# Patient Record
Sex: MALE | Race: WHITE | URBAN - METROPOLITAN AREA
[De-identification: names, ages, dates, MRNs, and addresses within clinical notes are randomized per-mention and may not be internally consistent; named-entity substitution may affect disease eponyms.]

---

## 2019-11-25 ENCOUNTER — OFFICE VISIT (OUTPATIENT)
Dept: URBAN - METROPOLITAN AREA CLINIC 48 | Facility: CLINIC | Age: 64
End: 2019-11-25
Payer: COMMERCIAL

## 2019-11-25 PROCEDURE — 92020 GONIOSCOPY: CPT | Performed by: OPHTHALMOLOGY

## 2019-11-25 PROCEDURE — 92012 INTRM OPH EXAM EST PATIENT: CPT | Performed by: OPHTHALMOLOGY

## 2019-11-25 PROCEDURE — 66250 FOLLOW-UP SURGERY OF EYE: CPT | Performed by: OPHTHALMOLOGY

## 2019-11-25 RX ORDER — BRIMONIDINE TARTRATE, TIMOLOL MALEATE 2; 5 MG/ML; MG/ML
SOLUTION/ DROPS OPHTHALMIC
Qty: 5 | Refills: 2 | Status: INACTIVE
Start: 2019-11-25 | End: 2019-11-25

## 2019-11-25 RX ORDER — PREDNISOLONE ACETATE 10 MG/ML
1 % SUSPENSION/ DROPS OPHTHALMIC
Qty: 5 | Refills: 2 | Status: INACTIVE
Start: 2019-11-25 | End: 2020-03-16

## 2019-11-25 RX ORDER — OFLOXACIN 3 MG/ML
0.3 % SOLUTION/ DROPS OPHTHALMIC
Qty: 5 | Refills: 0 | Status: INACTIVE
Start: 2019-11-25 | End: 2019-11-25

## 2019-11-25 RX ORDER — BIMATOPROST 0.1 MG/ML
0.01 % SOLUTION/ DROPS OPHTHALMIC
Qty: 2.5 | Refills: 2 | Status: INACTIVE
Start: 2019-11-25 | End: 2019-11-25

## 2019-11-25 ASSESSMENT — KERATOMETRY
OD: 44.13
OS: 43.75

## 2019-11-25 ASSESSMENT — INTRAOCULAR PRESSURE
OD: 21
OS: 35

## 2019-11-25 NOTE — IMPRESSION/PLAN
Impression: Capslr glaucoma w/pseudxf lens, left eye, severe stage: Y53.9258. Plan: IOP elevated on OS Bleb needling with 5 Fu Injection given today. All potential side effects and benefits of medication discussed. Reassured patient of current condition and treatment. Bleb needling with 5 Fu Injection risks and benefits were discussed, explained and understood by patient. Patient advised to use Preservative Free eye drops due to injection causing dryness. Start Oflaxacin TID OS for a week IOP is extremely high to OS recommend  bleb revision with diluted  MC and start  tropical ocular medication Lumigan QHS OS, Combigan BID OS, to further lowering of intraocular pressure . Risks and benefits of the surgery explained. Risk of hypotony in view of avascular bleb. Patient understands that the purpose of the surgery is to lower eye pressure and not to improve vision. Patient is to start Prednisolone QID and Ofloxacin QID times a day one day prior to surgery. All potential side effects and benefits of medication discussed. Patient to bring in all drops to PO visits.  

Schedule Bleb revision augmented with mitomycin C;  RL2 OS

## 2019-12-04 ENCOUNTER — SURGERY (OUTPATIENT)
Dept: URBAN - METROPOLITAN AREA SURGERY 26 | Facility: SURGERY | Age: 64
End: 2019-12-04
Payer: COMMERCIAL

## 2019-12-04 PROCEDURE — 66250 FOLLOW-UP SURGERY OF EYE: CPT | Performed by: OPHTHALMOLOGY

## 2019-12-05 ENCOUNTER — POST-OPERATIVE VISIT (OUTPATIENT)
Dept: URBAN - METROPOLITAN AREA CLINIC 48 | Facility: CLINIC | Age: 64
End: 2019-12-05

## 2019-12-05 PROCEDURE — 99024 POSTOP FOLLOW-UP VISIT: CPT | Performed by: OPHTHALMOLOGY

## 2019-12-05 ASSESSMENT — INTRAOCULAR PRESSURE: OS: 3

## 2019-12-12 ENCOUNTER — POST-OPERATIVE VISIT (OUTPATIENT)
Dept: URBAN - METROPOLITAN AREA CLINIC 48 | Facility: CLINIC | Age: 64
End: 2019-12-12

## 2019-12-12 PROCEDURE — 99024 POSTOP FOLLOW-UP VISIT: CPT | Performed by: OPHTHALMOLOGY

## 2019-12-12 ASSESSMENT — INTRAOCULAR PRESSURE: OS: 7

## 2020-01-06 ENCOUNTER — POST-OPERATIVE VISIT (OUTPATIENT)
Dept: URBAN - METROPOLITAN AREA CLINIC 48 | Facility: CLINIC | Age: 65
End: 2020-01-06
Payer: COMMERCIAL

## 2020-01-06 DIAGNOSIS — Z09 ENCNTR FOR F/U EXAM AFT TRTMT FOR COND OTH THAN MALIG NEOPLM: Primary | ICD-10-CM

## 2020-01-06 PROCEDURE — 99024 POSTOP FOLLOW-UP VISIT: CPT | Performed by: OPHTHALMOLOGY

## 2020-01-06 ASSESSMENT — INTRAOCULAR PRESSURE
OD: 23
OS: 33

## 2020-01-13 ENCOUNTER — POST-OPERATIVE VISIT (OUTPATIENT)
Dept: URBAN - METROPOLITAN AREA CLINIC 48 | Facility: CLINIC | Age: 65
End: 2020-01-13

## 2020-01-13 PROCEDURE — 99024 POSTOP FOLLOW-UP VISIT: CPT | Performed by: OPHTHALMOLOGY

## 2020-01-13 RX ORDER — BIMATOPROST 0.1 MG/ML
0.01 % SOLUTION/ DROPS OPHTHALMIC
Qty: 2.5 | Refills: 2 | Status: INACTIVE
Start: 2020-01-13 | End: 2020-03-16

## 2020-01-13 ASSESSMENT — INTRAOCULAR PRESSURE
OD: 22
OS: 27

## 2020-01-20 ENCOUNTER — POST-OPERATIVE VISIT (OUTPATIENT)
Dept: URBAN - METROPOLITAN AREA CLINIC 48 | Facility: CLINIC | Age: 65
End: 2020-01-20
Payer: COMMERCIAL

## 2020-01-20 PROCEDURE — 66250 FOLLOW-UP SURGERY OF EYE: CPT | Performed by: OPHTHALMOLOGY

## 2020-01-20 PROCEDURE — 99024 POSTOP FOLLOW-UP VISIT: CPT | Performed by: OPHTHALMOLOGY

## 2020-01-20 ASSESSMENT — INTRAOCULAR PRESSURE: OS: 28

## 2020-02-03 ENCOUNTER — POST-OPERATIVE VISIT (OUTPATIENT)
Dept: URBAN - METROPOLITAN AREA CLINIC 48 | Facility: CLINIC | Age: 65
End: 2020-02-03

## 2020-02-03 PROCEDURE — 99024 POSTOP FOLLOW-UP VISIT: CPT | Performed by: OPHTHALMOLOGY

## 2020-02-03 ASSESSMENT — INTRAOCULAR PRESSURE
OD: 23
OS: 13

## 2020-02-24 ENCOUNTER — POST-OPERATIVE VISIT (OUTPATIENT)
Dept: URBAN - METROPOLITAN AREA CLINIC 48 | Facility: CLINIC | Age: 65
End: 2020-02-24

## 2020-02-24 PROCEDURE — 99024 POSTOP FOLLOW-UP VISIT: CPT | Performed by: OPHTHALMOLOGY

## 2020-02-24 ASSESSMENT — INTRAOCULAR PRESSURE
OS: 17
OD: 20

## 2020-03-16 ENCOUNTER — OFFICE VISIT (OUTPATIENT)
Dept: URBAN - METROPOLITAN AREA CLINIC 48 | Facility: CLINIC | Age: 65
End: 2020-03-16
Payer: COMMERCIAL

## 2020-03-16 DIAGNOSIS — H40.1423 CAPSLR GLAUCOMA W/PSEUDXF LENS, LEFT EYE, SEVERE STAGE: Primary | ICD-10-CM

## 2020-03-16 PROCEDURE — 92012 INTRM OPH EXAM EST PATIENT: CPT | Performed by: OPHTHALMOLOGY

## 2020-03-16 ASSESSMENT — INTRAOCULAR PRESSURE
OS: 23
OD: 20

## 2020-03-16 NOTE — IMPRESSION/PLAN
Impression: Capslr glaucoma w/pseudxf lens, left eye, severe stage: R53.0113. Plan: IOP high, thick bleb. Restart lumigan qhs OS. IOP check in 6 weeks with testing.

## 2021-11-11 ENCOUNTER — OFFICE VISIT (OUTPATIENT)
Dept: URBAN - METROPOLITAN AREA CLINIC 48 | Facility: CLINIC | Age: 66
End: 2021-11-11
Payer: MEDICARE

## 2021-11-11 PROCEDURE — 92014 COMPRE OPH EXAM EST PT 1/>: CPT | Performed by: OPHTHALMOLOGY

## 2021-11-11 PROCEDURE — 92133 CPTRZD OPH DX IMG PST SGM ON: CPT | Performed by: OPHTHALMOLOGY

## 2021-11-11 RX ORDER — PREDNISOLONE ACETATE 10 MG/ML
1 % SUSPENSION/ DROPS OPHTHALMIC
Qty: 5 | Refills: 2 | Status: ACTIVE
Start: 2021-11-11

## 2021-11-11 ASSESSMENT — INTRAOCULAR PRESSURE
OS: 20
OD: 16

## 2021-11-11 NOTE — IMPRESSION/PLAN
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 Plan: likely prior to prior accident, glaucoma and cataract surgery, Patient to use PF qid OS Marky bettina qid OS Refer to Dr. Elizabeth Ventura 6 weeks If cornea transplant indicated, Post ops can be done at [facility] RTC  3 weeks Dr. Karis De Leon

## 2021-12-13 ENCOUNTER — OFFICE VISIT (OUTPATIENT)
Dept: URBAN - METROPOLITAN AREA CLINIC 48 | Facility: CLINIC | Age: 66
End: 2021-12-13
Payer: MEDICARE

## 2021-12-13 PROCEDURE — 99213 OFFICE O/P EST LOW 20 MIN: CPT | Performed by: OPHTHALMOLOGY

## 2021-12-13 RX ORDER — BRIMONIDINE TARTRATE, TIMOLOL MALEATE 2; 5 MG/ML; MG/ML
SOLUTION/ DROPS OPHTHALMIC
Qty: 5 | Refills: 3 | Status: INACTIVE
Start: 2021-12-13 | End: 2022-01-26

## 2021-12-13 ASSESSMENT — INTRAOCULAR PRESSURE
OD: 20
OS: 20

## 2021-12-13 NOTE — IMPRESSION/PLAN
Impression: Capslr glaucoma w/pseudxf lens, left eye, severe stage: H86.4181. Plan: IOP elevated OU. Possible steroid responder. Will start IOP lowering drops. Pred BID x1wk, then d/c OS Continue: Marky QID OS Restart: Combigan BID OU

## 2021-12-13 NOTE — IMPRESSION/PLAN
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 Plan: Edema still persistent OS. Refer to Dr. Yana Johnson for further management Pred BID x1wk, then d/c OS Continue: Marky bettina qid OS 

RTC w/ Dr. Yana Johnson next available

## 2022-01-03 ENCOUNTER — OFFICE VISIT (OUTPATIENT)
Dept: URBAN - METROPOLITAN AREA CLINIC 48 | Facility: CLINIC | Age: 67
End: 2022-01-03
Payer: MEDICARE

## 2022-01-03 PROCEDURE — 99213 OFFICE O/P EST LOW 20 MIN: CPT | Performed by: OPHTHALMOLOGY

## 2022-01-03 ASSESSMENT — INTRAOCULAR PRESSURE
OS: 19
OD: 17

## 2022-01-03 NOTE — IMPRESSION/PLAN
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 Plan: Persistent Edema . Keep clarisse w/ Dr. Fide Morin for further management Continue: Marky dunbar qid OS

## 2022-01-03 NOTE — IMPRESSION/PLAN
Impression: Capslr glaucoma w/pseudxf lens, left eye, severe stage: G45.6634. Plan: IOP continues elevated OU. Possible steroid responder. Patient did not d/c Pred as previously directed. d/c Pred Continue: Marky QID OS, Combigan BID OU

## 2022-01-25 ENCOUNTER — OFFICE VISIT (OUTPATIENT)
Dept: URBAN - METROPOLITAN AREA CLINIC 48 | Facility: CLINIC | Age: 67
End: 2022-01-25
Payer: MEDICARE

## 2022-01-25 DIAGNOSIS — H40.1124 PRIMARY OPEN-ANGLE GLAUCOMA, LEFT EYE, INDETERMINATE STAGE: Primary | ICD-10-CM

## 2022-01-25 PROCEDURE — 92133 CPTRZD OPH DX IMG PST SGM ON: CPT | Performed by: OPHTHALMOLOGY

## 2022-01-25 PROCEDURE — 99214 OFFICE O/P EST MOD 30 MIN: CPT | Performed by: OPHTHALMOLOGY

## 2022-01-25 PROCEDURE — 92083 EXTENDED VISUAL FIELD XM: CPT | Performed by: OPHTHALMOLOGY

## 2022-01-25 ASSESSMENT — INTRAOCULAR PRESSURE
OD: 23
OS: 20

## 2022-01-25 NOTE — IMPRESSION/PLAN
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 Plan: Continue jaden qid OS, awaiting to see Dr Geetha Metcalf.

## 2022-01-25 NOTE — IMPRESSION/PLAN
Impression: Primary open-angle glaucoma, left eye, indeterminate stage: U17.3856. Plan: IOP borderline high OS, restrat combigan bid OS only, continue jaden qid OS. Follow up in 4 months.

## 2022-03-09 ENCOUNTER — OFFICE VISIT (OUTPATIENT)
Dept: URBAN - METROPOLITAN AREA CLINIC 10 | Facility: CLINIC | Age: 67
End: 2022-03-09
Payer: MEDICARE

## 2022-03-09 DIAGNOSIS — H18.12 BULLOUS KERATOPATHY, LEFT EYE: Primary | ICD-10-CM

## 2022-03-09 DIAGNOSIS — H17.12 CENTRAL CORNEAL OPACITY, LEFT EYE: ICD-10-CM

## 2022-03-09 PROCEDURE — 92286 ANT SGM IMG I&R SPECLR MIC: CPT | Performed by: OPHTHALMOLOGY

## 2022-03-09 PROCEDURE — 76514 ECHO EXAM OF EYE THICKNESS: CPT | Performed by: OPHTHALMOLOGY

## 2022-03-09 PROCEDURE — 92025 CPTRIZED CORNEAL TOPOGRAPHY: CPT | Performed by: OPHTHALMOLOGY

## 2022-03-09 PROCEDURE — 99204 OFFICE O/P NEW MOD 45 MIN: CPT | Performed by: OPHTHALMOLOGY

## 2022-03-09 RX ORDER — OFLOXACIN 3 MG/ML
0.3 % SOLUTION/ DROPS OPHTHALMIC
Qty: 1 | Refills: 1 | Status: ACTIVE
Start: 2022-03-09

## 2022-03-09 RX ORDER — PREDNISOLONE ACETATE 10 MG/ML
1 % SUSPENSION/ DROPS OPHTHALMIC
Qty: 5 | Refills: 3 | Status: ACTIVE
Start: 2022-03-09

## 2022-03-09 ASSESSMENT — INTRAOCULAR PRESSURE
OS: 18
OD: 22

## 2022-03-09 ASSESSMENT — VISUAL ACUITY
OS: 20/600
OD: 20/20

## 2022-03-09 NOTE — IMPRESSION/PLAN
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 Plan: I would recommend DSAEK surgery, a version of endothelial keratoplasty replacing endothelial cells with descemet's membrane. The rejection rate with this procedure is lower versus the previous corneal transplants. The visual recovery is faster but may still take weeks to months. In patients with a history of glaucoma, retinal problems, prior vitrectomies, or positioning problems, I recommend DSAEK. Risks of DSAEK include similar risks to any intraocular surgery such as bleeding and infection. Risks include rejection, need for additional surgery, need for glasses after, and the risks from the medications used. Steroids should not be stopped without instruction by a doctor. Information packet given. Questions answered. Schedule DSEK + AK OS 60-80 microns. 
POD#1 and POW#1 in the [place], may go home after POD#1 visit

## 2022-03-09 NOTE — IMPRESSION/PLAN
Impression: Central corneal opacity, left eye: H17.12. Plan: Visually significant. Recommend (anterior keratectomy) AK  to help smooth surface. Surface irregularity contributing to decreased vision through causing additional astigmatism. Recommend smoothening procedure with to remove irregularity. This is a treatment for the symptom, not a cure for the cause of the problem and recurrence can happen. Dry eye care stressed to patient. Surface can take 1-4 weeks to heal and irregularities can take 1-3 months to stabilize. Pt understands that vision can initially be worse secondary to treatment. Patient understands that treatment isn't urgent and is their choice. Risks include infection and recurrence. A contact lens (hard or soft) may be needed for best vision. The change in refraction is difficult to predict. RBA reviewed including infection, irregular astigmatism and delayed healing. Patient elects treatment.  Schedule AK at time of DSEK OS

## 2022-03-09 NOTE — IMPRESSION/PLAN
Impression: Capslr glaucoma w/pseudxf lens, left eye, severe stage: Y95.2377. Plan: Continue: Marky SOUAZ OS, Dennis JORDAN OU Under the care of Dr. Kavon Kearney

## 2022-04-13 ENCOUNTER — ADULT PHYSICAL (OUTPATIENT)
Dept: URBAN - METROPOLITAN AREA CLINIC 10 | Facility: CLINIC | Age: 67
End: 2022-04-13
Payer: MEDICARE

## 2022-04-13 DIAGNOSIS — Z01.818 ENCOUNTER FOR OTHER PREPROCEDURAL EXAMINATION: Primary | ICD-10-CM

## 2022-04-13 DIAGNOSIS — H18.12 BULLOUS KERATOPATHY, LEFT EYE: ICD-10-CM

## 2022-04-13 PROCEDURE — 99202 OFFICE O/P NEW SF 15 MIN: CPT | Performed by: PHYSICIAN ASSISTANT

## 2022-05-03 ENCOUNTER — SURGERY (OUTPATIENT)
Dept: URBAN - METROPOLITAN AREA SURGERY 5 | Facility: SURGERY | Age: 67
End: 2022-05-03
Payer: MEDICARE

## 2022-05-03 PROCEDURE — 65756 CORNEAL TRNSPL ENDOTHELIAL: CPT | Performed by: OPHTHALMOLOGY

## 2022-05-03 RX ORDER — ACETAMINOPHEN AND CODEINE PHOSPHATE 300; 30 MG/1; MG/1
TABLET ORAL
Qty: 10 | Refills: 0 | Status: ACTIVE
Start: 2022-05-03

## 2022-05-04 ENCOUNTER — POST-OPERATIVE VISIT (OUTPATIENT)
Dept: URBAN - METROPOLITAN AREA CLINIC 10 | Facility: CLINIC | Age: 67
End: 2022-05-04
Payer: MEDICARE

## 2022-05-04 DIAGNOSIS — Z48.810 ENCOUNTER FOR SURGICAL AFTERCARE FOLLOWING SURGERY ON A SENSE ORGAN: Primary | ICD-10-CM

## 2022-05-04 PROCEDURE — 99024 POSTOP FOLLOW-UP VISIT: CPT | Performed by: STUDENT IN AN ORGANIZED HEALTH CARE EDUCATION/TRAINING PROGRAM

## 2022-05-04 NOTE — IMPRESSION/PLAN
Impression: S/P DMEK (Descemet Membrane Endothelial Keratoplasty); Peripheral iridectomy OS - 1 Day. Encounter for surgical aftercare following surgery on a sense organ  Z48.810.  Plan: RTC as scheduled

## 2022-05-09 ENCOUNTER — POST-OPERATIVE VISIT (OUTPATIENT)
Dept: URBAN - METROPOLITAN AREA CLINIC 10 | Facility: CLINIC | Age: 67
End: 2022-05-09
Payer: MEDICARE

## 2022-05-09 DIAGNOSIS — Z94.7 CORNEAL TRANSPLANT STATUS: Primary | ICD-10-CM

## 2022-05-09 PROCEDURE — 99024 POSTOP FOLLOW-UP VISIT: CPT | Performed by: OPHTHALMOLOGY

## 2022-05-09 ASSESSMENT — INTRAOCULAR PRESSURE: OS: 14

## 2022-05-09 NOTE — IMPRESSION/PLAN
Impression: Corneal transplant status: Z94.7.
- s/p DSEK OS 5/3/22 Plan: POW#1, doing well, Graft attached 360. Possible vit inferiorly. IOP adequate. Precautions reviewed, Cont Pred and oflox QID, d/c Oflox. RTC 2-3 weeks for suture removal, IOP check.

## 2022-05-24 ENCOUNTER — OFFICE VISIT (OUTPATIENT)
Dept: URBAN - METROPOLITAN AREA CLINIC 48 | Facility: CLINIC | Age: 67
End: 2022-05-24
Payer: MEDICARE

## 2022-05-24 DIAGNOSIS — H40.1423 CAPSLR GLAUCOMA W/PSEUDXF LENS, LEFT EYE, SEVERE STAGE: Primary | ICD-10-CM

## 2022-05-24 PROCEDURE — 99213 OFFICE O/P EST LOW 20 MIN: CPT | Performed by: OPHTHALMOLOGY

## 2022-05-24 RX ORDER — DORZOLAMIDE HCL 20 MG/ML
2 % SOLUTION/ DROPS OPHTHALMIC
Qty: 10 | Refills: 5 | Status: ACTIVE
Start: 2022-05-24

## 2022-05-24 NOTE — IMPRESSION/PLAN
Impression: Corneal transplant status: Z94.7.
- s/p DSEK OS 5/3/22 Plan: Continue care as scheduled with Dr. Elizalde Sis

## 2022-05-24 NOTE — IMPRESSION/PLAN
Impression: Capslr glaucoma w/pseudxf lens, left eye, severe stage: G45.3837. Plan: IOP within acceptable range OD, elevated OS. Suspect steroid response. Restart: Combigan BID OS Start: Dorzolamide BID OS
d/c Ofloxacin Continue: Pred as directed by Dr. Glenn Arndt IOP check when sees Dr. Glenn Arndt in [facility]

## 2022-05-31 ENCOUNTER — OFFICE VISIT (OUTPATIENT)
Dept: URBAN - METROPOLITAN AREA CLINIC 10 | Facility: CLINIC | Age: 67
End: 2022-05-31
Payer: MEDICARE

## 2022-05-31 DIAGNOSIS — H40.1423 CAPSLR GLAUCOMA W/PSEUDXF LENS, LEFT EYE, SEVERE STAGE: ICD-10-CM

## 2022-05-31 DIAGNOSIS — Z94.7 CORNEAL TRANSPLANT STATUS: Primary | ICD-10-CM

## 2022-05-31 PROCEDURE — 99024 POSTOP FOLLOW-UP VISIT: CPT | Performed by: OPHTHALMOLOGY

## 2022-05-31 ASSESSMENT — INTRAOCULAR PRESSURE
OD: 16
OS: 17

## 2022-05-31 NOTE — IMPRESSION/PLAN
Impression: Corneal transplant status: Z94.7.
- s/p DSEK OS 5/3/22 Plan: POW#4, doing well, graft C/C. Sutures removed today, oflox gtt placed. Start oflox TID x 3 d then d/c. Continue PF QID until _6_/_3_, then decrease PF to TID OD, monthly taper until down to PF qd, and hold at this dose, do not stop. If IOPs cannot tolerate, may switch to FML or lotemax, same sig. ATs prn. Precautions reviewed.

## 2022-05-31 NOTE — IMPRESSION/PLAN
Impression: Capslr glaucoma w/pseudxf lens, left eye, severe stage: J98.4229.  Plan: IOP improved,  Cont Combigan BID OS and Dorzolamide BID OS

## 2022-06-16 ENCOUNTER — OFFICE VISIT (OUTPATIENT)
Dept: URBAN - METROPOLITAN AREA CLINIC 48 | Facility: CLINIC | Age: 67
End: 2022-06-16
Payer: MEDICARE

## 2022-06-16 DIAGNOSIS — H40.1423 CAPSLR GLAUCOMA W/PSEUDXF LENS, LEFT EYE, SEVERE STAGE: Primary | ICD-10-CM

## 2022-06-16 PROCEDURE — 99213 OFFICE O/P EST LOW 20 MIN: CPT | Performed by: OPHTHALMOLOGY

## 2022-06-16 ASSESSMENT — INTRAOCULAR PRESSURE
OD: 17
OS: 20

## 2022-06-16 NOTE — IMPRESSION/PLAN
Impression: Capslr glaucoma w/pseudxf lens, left eye, severe stage: S69.6159. Plan: IOP stable on today's visit. Will lengthen visits due to Dr. Gregory Richardson overseeing patient. Continue:
Combigan BID OS Dorzolamide BID OS Prednisolone TID OS (or as directed) RTC 3 months IOP check

## 2022-07-21 ENCOUNTER — OFFICE VISIT (OUTPATIENT)
Dept: URBAN - METROPOLITAN AREA CLINIC 10 | Facility: CLINIC | Age: 67
End: 2022-07-21
Payer: MEDICARE

## 2022-07-21 DIAGNOSIS — H40.1423 CAPSLR GLAUCOMA W/PSEUDXF LENS, LEFT EYE, SEVERE STAGE: ICD-10-CM

## 2022-07-21 DIAGNOSIS — H16.142 PUNCTATE KERATITIS, LEFT EYE: ICD-10-CM

## 2022-07-21 DIAGNOSIS — Z94.7 CORNEAL TRANSPLANT STATUS: Primary | ICD-10-CM

## 2022-07-21 PROCEDURE — 68761 CLOSE TEAR DUCT OPENING: CPT | Performed by: OPHTHALMOLOGY

## 2022-07-21 PROCEDURE — 99024 POSTOP FOLLOW-UP VISIT: CPT | Performed by: OPHTHALMOLOGY

## 2022-07-21 ASSESSMENT — INTRAOCULAR PRESSURE
OD: 18
OS: 26

## 2022-07-21 ASSESSMENT — VISUAL ACUITY: OS: 20/40

## 2022-07-21 NOTE — IMPRESSION/PLAN
Impression: Corneal transplant status: Z94.7.
- s/p DSEK OS 5/3/22 Plan: POM#3, doing well, graft C/C. Continue PF BID until _8_/_3_, then decrease PF to qd, do not stop. If IOPs cannot tolerate, may switch to FML or lotemax, same sig but do not stop. ATs prn. Precautions reviewed. 
Given stability, may be monitored in [place] from now on, cornea prn

## 2022-07-21 NOTE — IMPRESSION/PLAN
Impression: Capslr glaucoma w/pseudxf lens, left eye, severe stage: N05.4806. Plan: IOP elevated today. Increase Dorzolamide to TID OS Continue Combigan BID OS

## 2022-07-21 NOTE — IMPRESSION/PLAN
Impression: Punctate keratitis, left eye: H16.142. Plan: Dry eyes account for the patient's complaints. There is no evidence of permanent changes to the cornea. Recommend Artificial Tear drops  BID to QID and Artificial Tear gel  QHS OU and environment changes. Patient expressed understanding. 
Ultraplug 0.4 placed today LLL

## 2022-08-22 ENCOUNTER — OFFICE VISIT (OUTPATIENT)
Dept: URBAN - METROPOLITAN AREA CLINIC 48 | Facility: CLINIC | Age: 67
End: 2022-08-22
Payer: MEDICARE

## 2022-08-22 DIAGNOSIS — H40.1423 CAPSLR GLAUCOMA W/PSEUDXF LENS, LEFT EYE, SEVERE STAGE: Primary | ICD-10-CM

## 2022-08-22 PROCEDURE — 99213 OFFICE O/P EST LOW 20 MIN: CPT | Performed by: OPHTHALMOLOGY

## 2022-08-22 RX ORDER — ACETAZOLAMIDE 125 MG/1
125 MG TABLET ORAL
Qty: 90 | Refills: 2 | Status: INACTIVE
Start: 2022-08-22 | End: 2022-08-22

## 2022-08-22 RX ORDER — FLUOROMETHOLONE 1 MG/ML
0.1 % SOLUTION/ DROPS OPHTHALMIC
Qty: 5 | Refills: 2 | Status: ACTIVE
Start: 2022-08-22

## 2022-08-22 RX ORDER — ACETAZOLAMIDE 125 MG/1
125 MG TABLET ORAL
Qty: 368 | Refills: 0 | Status: ACTIVE
Start: 2022-08-22

## 2022-08-22 ASSESSMENT — INTRAOCULAR PRESSURE
OD: 19
OS: 37

## 2022-08-22 NOTE — IMPRESSION/PLAN
Impression: Capslr glaucoma w/pseudxf lens, left eye, severe stage: C37.1418. Plan: IOP continues elevated to left eye today. Possibly related to topical steroids Continue:
Dorzolamide to TID OS Combigan BID OS
D/C PF Start: Fluorometholone BID OS Start: Diamox 125 mg QID PO

## 2022-09-06 ENCOUNTER — OFFICE VISIT (OUTPATIENT)
Dept: URBAN - METROPOLITAN AREA CLINIC 48 | Facility: CLINIC | Age: 67
End: 2022-09-06
Payer: MEDICARE

## 2022-09-06 DIAGNOSIS — H40.1423 CAPSLR GLAUCOMA W/PSEUDXF LENS, LEFT EYE, SEVERE STAGE: Primary | ICD-10-CM

## 2022-09-06 PROCEDURE — 99213 OFFICE O/P EST LOW 20 MIN: CPT | Performed by: OPHTHALMOLOGY

## 2022-09-06 RX ORDER — PREDNISOLONE ACETATE 10 MG/ML
1 % SUSPENSION/ DROPS OPHTHALMIC
Qty: 5 | Refills: 2 | Status: ACTIVE
Start: 2022-09-06

## 2022-09-06 RX ORDER — OFLOXACIN 3 MG/ML
0.3 % SOLUTION/ DROPS OPHTHALMIC
Qty: 5 | Refills: 1 | Status: ACTIVE
Start: 2022-09-06

## 2022-09-06 ASSESSMENT — INTRAOCULAR PRESSURE
OD: 15
OS: 36

## 2022-09-06 NOTE — IMPRESSION/PLAN
Impression: Capslr glaucoma w/pseudxf lens, left eye, severe stage: T51.5052. Plan: encapsulated bleb with very high IOP with MTMT Progressive glaucoma with elevated IOP, patient prefers bleb revision than tropical ocular medication. Recommend bleb revision augmented with diluted mitomycin C for further lowering of intraocular pressure . Risks and benefits of the surgery explained. Risk of hypotony in view of avascular bleb. Patient understands that the purpose of the surgery is to lower eye pressure and not to improve vision. Please send [facility] to [place] in [facility] Patient is to start Prednisolone QID and Ofloxacin QID times a day one day prior to surgery. All potential side effects and benefits of medication discussed. Patient to bring in all drops to PO visits.  

Schedule Bleb revision  augmented with 5 FU OS;  RL2

## 2022-09-07 ENCOUNTER — SURGERY (OUTPATIENT)
Dept: URBAN - METROPOLITAN AREA SURGERY 26 | Facility: SURGERY | Age: 67
End: 2022-09-07
Payer: MEDICARE

## 2022-09-07 PROCEDURE — 66250 FOLLOW-UP SURGERY OF EYE: CPT | Performed by: OPHTHALMOLOGY

## 2022-09-08 ENCOUNTER — POST-OPERATIVE VISIT (OUTPATIENT)
Dept: URBAN - METROPOLITAN AREA CLINIC 48 | Facility: CLINIC | Age: 67
End: 2022-09-08
Payer: MEDICARE

## 2022-09-08 DIAGNOSIS — Z48.810 ENCOUNTER FOR SURGICAL AFTERCARE FOLLOWING SURGERY ON A SENSE ORGAN: Primary | ICD-10-CM

## 2022-09-08 PROCEDURE — 99024 POSTOP FOLLOW-UP VISIT: CPT | Performed by: OPHTHALMOLOGY

## 2022-09-08 ASSESSMENT — INTRAOCULAR PRESSURE: OS: 7

## 2022-09-08 NOTE — IMPRESSION/PLAN
Impression: S/P Bleb Revision with Fluorouracil OS - 1 Day. Encounter for surgical aftercare following surgery on a sense organ  Z48.810. Excellent post op course   Post operative instructions reviewed - Condition is improving - Plan: Precautions discussed with patient Oflox QID OS Pred QID OS Cosopt BID
D/C Dorzolamide Reduce: Diamox to 62.5mg QID PO


RTC 1 week PO

## 2022-09-15 ENCOUNTER — POST-OPERATIVE VISIT (OUTPATIENT)
Dept: URBAN - METROPOLITAN AREA CLINIC 48 | Facility: CLINIC | Age: 67
End: 2022-09-15
Payer: MEDICARE

## 2022-09-15 DIAGNOSIS — Z48.810 ENCOUNTER FOR SURGICAL AFTERCARE FOLLOWING SURGERY ON A SENSE ORGAN: Primary | ICD-10-CM

## 2022-09-15 PROCEDURE — 99024 POSTOP FOLLOW-UP VISIT: CPT | Performed by: OPHTHALMOLOGY

## 2022-09-15 ASSESSMENT — INTRAOCULAR PRESSURE: OS: 4

## 2022-09-22 ENCOUNTER — POST-OPERATIVE VISIT (OUTPATIENT)
Dept: URBAN - METROPOLITAN AREA CLINIC 48 | Facility: CLINIC | Age: 67
End: 2022-09-22
Payer: MEDICARE

## 2022-09-22 DIAGNOSIS — Z48.810 ENCOUNTER FOR SURGICAL AFTERCARE FOLLOWING SURGERY ON A SENSE ORGAN: Primary | ICD-10-CM

## 2022-09-22 PROCEDURE — 99024 POSTOP FOLLOW-UP VISIT: CPT | Performed by: OPHTHALMOLOGY

## 2022-09-22 ASSESSMENT — INTRAOCULAR PRESSURE
OS: 5
OD: 21

## 2022-09-22 NOTE — IMPRESSION/PLAN
Impression: S/P Bleb Revision with Fluorouracil OS - 15 Days. Encounter for surgical aftercare following surgery on a sense organ  Z48.810.  Plan: d/c Combigan 
continue Pred QD for 1 week then d/c 

RTC 2 weeks PO

## 2022-10-11 ENCOUNTER — POST-OPERATIVE VISIT (OUTPATIENT)
Dept: URBAN - METROPOLITAN AREA CLINIC 48 | Facility: CLINIC | Age: 67
End: 2022-10-11
Payer: MEDICARE

## 2022-10-11 DIAGNOSIS — Z48.810 ENCOUNTER FOR SURGICAL AFTERCARE FOLLOWING SURGERY ON A SENSE ORGAN: Primary | ICD-10-CM

## 2022-10-11 PROCEDURE — 99024 POSTOP FOLLOW-UP VISIT: CPT | Performed by: OPHTHALMOLOGY

## 2022-10-11 ASSESSMENT — INTRAOCULAR PRESSURE: OS: 12

## 2022-10-11 NOTE — IMPRESSION/PLAN
Impression: S/P Bleb Revision with Fluorouracil OS - 34 Days. Encounter for surgical aftercare following surgery on a sense organ  Z48.810. Excellent post op course   Post operative instructions reviewed - Condition is improving - Plan: Discussed and reviewed findings with patient, Eye is doing well
continue with thera tears, no need to restart Pred RTC 1 month

## 2022-11-10 ENCOUNTER — POST-OPERATIVE VISIT (OUTPATIENT)
Dept: URBAN - METROPOLITAN AREA CLINIC 48 | Facility: CLINIC | Age: 67
End: 2022-11-10
Payer: MEDICARE

## 2022-11-10 DIAGNOSIS — Z48.810 ENCOUNTER FOR SURGICAL AFTERCARE FOLLOWING SURGERY ON A SENSE ORGAN: Primary | ICD-10-CM

## 2022-11-10 PROCEDURE — 99024 POSTOP FOLLOW-UP VISIT: CPT | Performed by: OPHTHALMOLOGY

## 2022-11-10 ASSESSMENT — INTRAOCULAR PRESSURE
OS: 12
OD: 19

## 2022-11-10 NOTE — IMPRESSION/PLAN
Impression: S/P Bleb Revision with Fluorouracil OS - 64 Days. Encounter for surgical aftercare following surgery on a sense organ  Z48.810. Plan: IOP within excellent range OU Continue to monitor off drops.

## 2023-02-28 ENCOUNTER — OFFICE VISIT (OUTPATIENT)
Dept: URBAN - METROPOLITAN AREA CLINIC 48 | Facility: CLINIC | Age: 68
End: 2023-02-28
Payer: MEDICARE

## 2023-02-28 DIAGNOSIS — H40.1423 CAPSLR GLAUCOMA W/PSEUDXF LENS, LEFT EYE, SEVERE STAGE: Primary | ICD-10-CM

## 2023-02-28 PROCEDURE — 99213 OFFICE O/P EST LOW 20 MIN: CPT | Performed by: OPHTHALMOLOGY

## 2023-02-28 ASSESSMENT — INTRAOCULAR PRESSURE
OD: 21
OS: 10

## 2023-02-28 NOTE — IMPRESSION/PLAN
Impression: Capslr glaucoma w/pseudxf lens, left eye, severe stage: H27.2042. Plan: Discussed and reviewed diagnosis with patient today, understood by patient, intraocular pressure acceptable without medication. Continue without medication and observe, will continue to monitor.

## 2023-06-29 ENCOUNTER — OFFICE VISIT (OUTPATIENT)
Dept: URBAN - METROPOLITAN AREA CLINIC 48 | Facility: CLINIC | Age: 68
End: 2023-06-29
Payer: MEDICARE

## 2023-06-29 DIAGNOSIS — H40.1423 CAPSULAR GLAUCOMA WITH PSEUDOEXFOLIATION OF LENS, LEFT EYE, SEVERE STAGE: Primary | ICD-10-CM

## 2023-06-29 PROCEDURE — 92083 EXTENDED VISUAL FIELD XM: CPT | Performed by: OPHTHALMOLOGY

## 2023-06-29 PROCEDURE — 92133 CPTRZD OPH DX IMG PST SGM ON: CPT | Performed by: OPHTHALMOLOGY

## 2023-06-29 PROCEDURE — 99214 OFFICE O/P EST MOD 30 MIN: CPT | Performed by: OPHTHALMOLOGY

## 2023-06-29 ASSESSMENT — INTRAOCULAR PRESSURE
OS: 10
OD: 19

## 2023-06-29 NOTE — IMPRESSION/PLAN
Impression: Capsular glaucoma with pseudoexfoliation of lens, left eye, severe stage: J09.2814. Plan: Discussed and reviewed diagnosis with patient today, understood by patient, discussed reviewed VF and OCT with patient today, intraocular pressure stable without medication post trab OS.

## 2023-12-12 ENCOUNTER — OFFICE VISIT (OUTPATIENT)
Dept: URBAN - METROPOLITAN AREA CLINIC 48 | Facility: CLINIC | Age: 68
End: 2023-12-12
Payer: MEDICARE

## 2023-12-12 DIAGNOSIS — H40.1423 CAPSULAR GLAUCOMA WITH PSEUDOEXFOLIATION OF LENS, LEFT EYE, SEVERE STAGE: ICD-10-CM

## 2023-12-12 DIAGNOSIS — Z94.7 CORNEAL TRANSPLANT STATUS: Primary | ICD-10-CM

## 2023-12-12 DIAGNOSIS — H43.811 VITREOUS DEGENERATION, RIGHT EYE: ICD-10-CM

## 2023-12-12 DIAGNOSIS — H53.10 SUBJECTIVE VISUAL DISTURBANCE: ICD-10-CM

## 2023-12-12 DIAGNOSIS — H35.3131 NONEXUDATIVE MACULAR DEGENERATION, EARLY DRY STAGE, BILATERAL: ICD-10-CM

## 2023-12-12 PROCEDURE — 92134 CPTRZ OPH DX IMG PST SGM RTA: CPT | Performed by: OPHTHALMOLOGY

## 2023-12-12 PROCEDURE — 99214 OFFICE O/P EST MOD 30 MIN: CPT | Performed by: OPHTHALMOLOGY

## 2023-12-12 ASSESSMENT — INTRAOCULAR PRESSURE
OD: 18
OS: 10

## 2024-01-31 ENCOUNTER — OFFICE VISIT (OUTPATIENT)
Dept: URBAN - METROPOLITAN AREA CLINIC 48 | Facility: CLINIC | Age: 69
End: 2024-01-31
Payer: MEDICARE

## 2024-01-31 DIAGNOSIS — H40.041 STEROID RESPONDER, RIGHT EYE: Primary | ICD-10-CM

## 2024-01-31 PROCEDURE — 99213 OFFICE O/P EST LOW 20 MIN: CPT | Performed by: OPHTHALMOLOGY

## 2024-01-31 RX ORDER — SODIUM CHLORIDE 50 MG/ML
5 % SOLUTION OPHTHALMIC
Qty: 5 | Refills: 3 | Status: ACTIVE
Start: 2024-01-31

## 2024-01-31 ASSESSMENT — INTRAOCULAR PRESSURE
OS: 10
OD: 27

## 2024-03-12 ENCOUNTER — OFFICE VISIT (OUTPATIENT)
Dept: URBAN - METROPOLITAN AREA CLINIC 48 | Facility: CLINIC | Age: 69
End: 2024-03-12
Payer: MEDICARE

## 2024-03-12 DIAGNOSIS — H40.041 STEROID RESPONDER, RIGHT EYE: Primary | ICD-10-CM

## 2024-03-12 DIAGNOSIS — H18.12 BULLOUS KERATOPATHY, LEFT EYE: ICD-10-CM

## 2024-03-12 PROCEDURE — 99213 OFFICE O/P EST LOW 20 MIN: CPT | Performed by: OPHTHALMOLOGY

## 2024-03-12 RX ORDER — LATANOPROST 50 UG/ML
0.005 % SOLUTION OPHTHALMIC
Qty: 2.5 | Refills: 3 | Status: ACTIVE
Start: 2024-03-12

## 2024-03-12 ASSESSMENT — INTRAOCULAR PRESSURE
OD: 25
OS: 12
OS: 11

## 2024-04-24 ENCOUNTER — OFFICE VISIT (OUTPATIENT)
Dept: URBAN - METROPOLITAN AREA CLINIC 48 | Facility: CLINIC | Age: 69
End: 2024-04-24
Payer: MEDICARE

## 2024-04-24 DIAGNOSIS — H40.041 STEROID RESPONDER, RIGHT EYE: Primary | ICD-10-CM

## 2024-04-24 PROCEDURE — 99213 OFFICE O/P EST LOW 20 MIN: CPT | Performed by: OPHTHALMOLOGY

## 2024-04-24 ASSESSMENT — INTRAOCULAR PRESSURE
OD: 18
OS: 15

## 2024-06-12 ENCOUNTER — OFFICE VISIT (OUTPATIENT)
Dept: URBAN - METROPOLITAN AREA CLINIC 48 | Facility: CLINIC | Age: 69
End: 2024-06-12
Payer: MEDICARE

## 2024-06-12 DIAGNOSIS — Z94.7 CORNEAL TRANSPLANT STATUS: ICD-10-CM

## 2024-06-12 DIAGNOSIS — H40.1423 CAPSLR GLAUCOMA W/PSEUDXF LENS, LEFT EYE, SEVERE STAGE: ICD-10-CM

## 2024-06-12 DIAGNOSIS — H35.3131 NONEXUDATIVE MACULAR DEGENERATION, EARLY DRY STAGE, BILATERAL: Primary | ICD-10-CM

## 2024-06-12 PROCEDURE — 99213 OFFICE O/P EST LOW 20 MIN: CPT | Performed by: OPHTHALMOLOGY

## 2024-06-12 RX ORDER — NETARSUDIL 0.2 MG/ML
0.02 % SOLUTION/ DROPS OPHTHALMIC; TOPICAL
Qty: 2.5 | Refills: 4 | Status: ACTIVE
Start: 2024-06-12

## 2024-06-12 RX ORDER — DORZOLAMIDE HCL 20 MG/ML
2 % SOLUTION/ DROPS OPHTHALMIC
Qty: 5 | Refills: 3 | Status: ACTIVE
Start: 2024-06-12

## 2024-06-12 RX ORDER — BRIMONIDINE TARTRATE 2 MG/ML
0.2 % SOLUTION/ DROPS OPHTHALMIC
Qty: 5 | Refills: 3 | Status: ACTIVE
Start: 2024-06-12

## 2024-06-12 RX ORDER — TIMOLOL MALEATE 2.5 MG/ML
0.25 % SOLUTION/ DROPS OPHTHALMIC
Qty: 4 | Refills: 4 | Status: ACTIVE
Start: 2024-06-12

## 2024-06-12 ASSESSMENT — INTRAOCULAR PRESSURE
OS: 42
OD: 17

## 2024-06-13 ENCOUNTER — OFFICE VISIT (OUTPATIENT)
Dept: URBAN - METROPOLITAN AREA CLINIC 48 | Facility: CLINIC | Age: 69
End: 2024-06-13
Payer: MEDICARE

## 2024-06-13 PROCEDURE — 99213 OFFICE O/P EST LOW 20 MIN: CPT | Performed by: OPHTHALMOLOGY

## 2024-06-13 ASSESSMENT — INTRAOCULAR PRESSURE
OD: 11
OS: 14

## 2024-06-24 ENCOUNTER — OFFICE VISIT (OUTPATIENT)
Dept: URBAN - METROPOLITAN AREA CLINIC 48 | Facility: CLINIC | Age: 69
End: 2024-06-24
Payer: MEDICARE

## 2024-06-24 PROCEDURE — 99213 OFFICE O/P EST LOW 20 MIN: CPT | Performed by: OPHTHALMOLOGY

## 2024-06-24 ASSESSMENT — INTRAOCULAR PRESSURE
OD: 20
OS: 37

## 2024-06-25 ENCOUNTER — OFFICE VISIT (OUTPATIENT)
Dept: URBAN - METROPOLITAN AREA CLINIC 48 | Facility: CLINIC | Age: 69
End: 2024-06-25
Payer: MEDICARE

## 2024-06-25 DIAGNOSIS — H40.1423 CAPSLR GLAUCOMA W/PSEUDXF LENS, LEFT EYE, SEVERE STAGE: Primary | ICD-10-CM

## 2024-06-25 PROCEDURE — 99213 OFFICE O/P EST LOW 20 MIN: CPT | Performed by: OPHTHALMOLOGY

## 2024-06-25 RX ORDER — MOXIFLOXACIN 5 MG/ML
0.5 % SOLUTION/ DROPS OPHTHALMIC
Qty: 5 | Refills: 2 | Status: ACTIVE
Start: 2024-06-25

## 2024-06-25 RX ORDER — PREDNISOLONE ACETATE 10 MG/ML
1 % SUSPENSION/ DROPS OPHTHALMIC
Qty: 5 | Refills: 2 | Status: ACTIVE
Start: 2024-06-25

## 2024-06-25 ASSESSMENT — INTRAOCULAR PRESSURE: OS: 35

## 2024-07-01 ENCOUNTER — Encounter (OUTPATIENT)
Dept: URBAN - METROPOLITAN AREA SURGERY 25 | Facility: SURGERY | Age: 69
End: 2024-07-01
Payer: MEDICARE

## 2024-07-01 ENCOUNTER — PROCEDURE (OUTPATIENT)
Dept: URBAN - METROPOLITAN AREA SURGERY 24 | Facility: SURGERY | Age: 69
End: 2024-07-01
Payer: MEDICARE

## 2024-07-01 PROCEDURE — 66183 INSERT ANT DRAINAGE DEVICE: CPT | Performed by: OPHTHALMOLOGY

## 2024-07-02 ENCOUNTER — POST-OPERATIVE VISIT (OUTPATIENT)
Dept: URBAN - METROPOLITAN AREA CLINIC 48 | Facility: CLINIC | Age: 69
End: 2024-07-02
Payer: MEDICARE

## 2024-07-02 DIAGNOSIS — Z48.810 ENCOUNTER FOR SURGICAL AFTERCARE FOLLOWING SURGERY ON A SENSE ORGAN: Primary | ICD-10-CM

## 2024-07-02 PROCEDURE — 99024 POSTOP FOLLOW-UP VISIT: CPT | Performed by: OPHTHALMOLOGY

## 2024-07-02 ASSESSMENT — INTRAOCULAR PRESSURE: OS: 6

## 2024-07-09 ENCOUNTER — POST-OPERATIVE VISIT (OUTPATIENT)
Dept: URBAN - METROPOLITAN AREA CLINIC 48 | Facility: CLINIC | Age: 69
End: 2024-07-09
Payer: MEDICARE

## 2024-07-09 DIAGNOSIS — Z48.810 ENCOUNTER FOR SURGICAL AFTERCARE FOLLOWING SURGERY ON A SENSE ORGAN: Primary | ICD-10-CM

## 2024-07-09 PROCEDURE — 99024 POSTOP FOLLOW-UP VISIT: CPT | Performed by: OPHTHALMOLOGY

## 2024-07-09 ASSESSMENT — INTRAOCULAR PRESSURE: OS: 9

## 2024-07-16 ENCOUNTER — POST-OPERATIVE VISIT (OUTPATIENT)
Dept: URBAN - METROPOLITAN AREA CLINIC 48 | Facility: CLINIC | Age: 69
End: 2024-07-16
Payer: MEDICARE

## 2024-07-16 DIAGNOSIS — Z48.810 ENCOUNTER FOR SURGICAL AFTERCARE FOLLOWING SURGERY ON A SENSE ORGAN: Primary | ICD-10-CM

## 2024-07-16 PROCEDURE — 99024 POSTOP FOLLOW-UP VISIT: CPT | Performed by: OPHTHALMOLOGY

## 2024-07-16 ASSESSMENT — INTRAOCULAR PRESSURE: OS: 14

## 2024-07-30 ENCOUNTER — POST-OPERATIVE VISIT (OUTPATIENT)
Dept: URBAN - METROPOLITAN AREA CLINIC 48 | Facility: CLINIC | Age: 69
End: 2024-07-30
Payer: MEDICARE

## 2024-07-30 DIAGNOSIS — Z48.810 ENCOUNTER FOR SURGICAL AFTERCARE FOLLOWING SURGERY ON A SENSE ORGAN: Primary | ICD-10-CM

## 2024-07-30 PROCEDURE — 99024 POSTOP FOLLOW-UP VISIT: CPT | Performed by: OPHTHALMOLOGY

## 2024-07-30 ASSESSMENT — INTRAOCULAR PRESSURE
OS: 13
OD: 18

## 2024-07-30 ASSESSMENT — KERATOMETRY
OD: 44.13
OS: 43.38

## 2024-08-20 ENCOUNTER — POST-OPERATIVE VISIT (OUTPATIENT)
Dept: URBAN - METROPOLITAN AREA CLINIC 48 | Facility: CLINIC | Age: 69
End: 2024-08-20
Payer: MEDICARE

## 2024-08-20 DIAGNOSIS — Z48.810 ENCOUNTER FOR SURGICAL AFTERCARE FOLLOWING SURGERY ON A SENSE ORGAN: Primary | ICD-10-CM

## 2024-08-20 PROCEDURE — 99024 POSTOP FOLLOW-UP VISIT: CPT | Performed by: OPHTHALMOLOGY

## 2024-08-20 ASSESSMENT — INTRAOCULAR PRESSURE: OS: 20

## 2024-08-27 ENCOUNTER — POST-OPERATIVE VISIT (OUTPATIENT)
Dept: URBAN - METROPOLITAN AREA CLINIC 48 | Facility: CLINIC | Age: 69
End: 2024-08-27
Payer: MEDICARE

## 2024-08-27 DIAGNOSIS — Z48.810 ENCOUNTER FOR SURGICAL AFTERCARE FOLLOWING SURGERY ON A SENSE ORGAN: Primary | ICD-10-CM

## 2024-08-27 PROCEDURE — 99024 POSTOP FOLLOW-UP VISIT: CPT | Performed by: OPHTHALMOLOGY

## 2024-08-27 ASSESSMENT — INTRAOCULAR PRESSURE: OS: 24

## 2024-09-10 ENCOUNTER — POST-OPERATIVE VISIT (OUTPATIENT)
Dept: URBAN - METROPOLITAN AREA CLINIC 48 | Facility: CLINIC | Age: 69
End: 2024-09-10
Payer: MEDICARE

## 2024-09-10 DIAGNOSIS — Z48.810 ENCOUNTER FOR SURGICAL AFTERCARE FOLLOWING SURGERY ON A SENSE ORGAN: Primary | ICD-10-CM

## 2024-09-10 PROCEDURE — 99024 POSTOP FOLLOW-UP VISIT: CPT | Performed by: OPHTHALMOLOGY

## 2024-09-10 ASSESSMENT — INTRAOCULAR PRESSURE: OS: 19

## 2024-09-27 NOTE — IMPRESSION/PLAN
Impression: S/P Bleb Revision with Fluorouracil OS - 8 Days. Encounter for surgical aftercare following surgery on a sense organ  Z48.810. Excellent post op course   Post operative instructions reviewed - Condition is improving - Plan: Continue: 
Prednisolone BID OS Combigan BID OS 
D/C Diamox 27-Sep-2024 23:10

## 2024-10-08 ENCOUNTER — POST-OPERATIVE VISIT (OUTPATIENT)
Dept: URBAN - METROPOLITAN AREA CLINIC 48 | Facility: CLINIC | Age: 69
End: 2024-10-08
Payer: MEDICARE

## 2024-10-08 DIAGNOSIS — Z48.810 ENCOUNTER FOR SURGICAL AFTERCARE FOLLOWING SURGERY ON A SENSE ORGAN: Primary | ICD-10-CM

## 2024-10-08 PROCEDURE — 99024 POSTOP FOLLOW-UP VISIT: CPT | Performed by: OPHTHALMOLOGY

## 2024-10-08 ASSESSMENT — INTRAOCULAR PRESSURE
OD: 16
OS: 27

## 2024-10-29 ENCOUNTER — OFFICE VISIT (OUTPATIENT)
Dept: URBAN - METROPOLITAN AREA CLINIC 10 | Facility: CLINIC | Age: 69
End: 2024-10-29
Payer: MEDICARE

## 2024-10-29 DIAGNOSIS — H16.012 CENTRAL CORNEAL ULCER, LEFT EYE: ICD-10-CM

## 2024-10-29 DIAGNOSIS — Z48.810 ENCOUNTER FOR SURGICAL AFTERCARE FOLLOWING SURGERY ON A SENSE ORGAN: ICD-10-CM

## 2024-10-29 DIAGNOSIS — T86.8412 CORNEAL TRANSPLANT FAILURE, LEFT EYE: Primary | ICD-10-CM

## 2024-10-29 PROCEDURE — 99214 OFFICE O/P EST MOD 30 MIN: CPT | Performed by: OPHTHALMOLOGY

## 2024-10-29 PROCEDURE — 65430 CORNEAL SMEAR: CPT | Performed by: OPHTHALMOLOGY

## 2024-10-29 RX ORDER — OFLOXACIN 3 MG/ML
0.3 % SOLUTION/ DROPS OPHTHALMIC
Qty: 10 | Refills: 4 | Status: ACTIVE
Start: 2024-10-29

## 2024-10-29 ASSESSMENT — INTRAOCULAR PRESSURE
OS: 18
OD: 16

## 2024-11-05 ENCOUNTER — OFFICE VISIT (OUTPATIENT)
Dept: URBAN - METROPOLITAN AREA CLINIC 48 | Facility: CLINIC | Age: 69
End: 2024-11-05
Payer: MEDICARE

## 2024-11-05 DIAGNOSIS — H40.1423 CAPSLR GLAUCOMA W/PSEUDXF LENS, LEFT EYE, SEVERE STAGE: ICD-10-CM

## 2024-11-05 PROCEDURE — 99213 OFFICE O/P EST LOW 20 MIN: CPT | Performed by: OPHTHALMOLOGY

## 2024-11-05 ASSESSMENT — INTRAOCULAR PRESSURE: OS: 30

## 2024-11-07 ENCOUNTER — OFFICE VISIT (OUTPATIENT)
Dept: URBAN - METROPOLITAN AREA CLINIC 48 | Facility: CLINIC | Age: 69
End: 2024-11-07
Payer: MEDICARE

## 2024-11-07 DIAGNOSIS — H16.012 CENTRAL CORNEAL ULCER, LEFT EYE: Primary | ICD-10-CM

## 2024-11-07 PROCEDURE — 99213 OFFICE O/P EST LOW 20 MIN: CPT | Performed by: OPHTHALMOLOGY

## 2024-11-07 ASSESSMENT — INTRAOCULAR PRESSURE
OS: 22
OD: 14

## 2024-11-12 ENCOUNTER — OFFICE VISIT (OUTPATIENT)
Dept: URBAN - METROPOLITAN AREA CLINIC 48 | Facility: CLINIC | Age: 69
End: 2024-11-12
Payer: MEDICARE

## 2024-11-12 DIAGNOSIS — H16.012 CENTRAL CORNEAL ULCER, LEFT EYE: Primary | ICD-10-CM

## 2024-11-12 PROCEDURE — 99213 OFFICE O/P EST LOW 20 MIN: CPT | Performed by: OPHTHALMOLOGY

## 2024-11-12 ASSESSMENT — INTRAOCULAR PRESSURE
OD: 17
OS: 19

## 2024-11-19 ENCOUNTER — OFFICE VISIT (OUTPATIENT)
Dept: URBAN - METROPOLITAN AREA CLINIC 48 | Facility: CLINIC | Age: 69
End: 2024-11-19
Payer: MEDICARE

## 2024-11-19 DIAGNOSIS — H40.1423 CAPSLR GLAUCOMA W/PSEUDXF LENS, LEFT EYE, SEVERE STAGE: ICD-10-CM

## 2024-11-19 DIAGNOSIS — H16.012 CENTRAL CORNEAL ULCER, LEFT EYE: Primary | ICD-10-CM

## 2024-11-19 PROCEDURE — 99213 OFFICE O/P EST LOW 20 MIN: CPT | Performed by: OPHTHALMOLOGY

## 2024-11-19 RX ORDER — OFLOXACIN 3 MG/ML
0.3 % SOLUTION/ DROPS OPHTHALMIC
Qty: 10 | Refills: 4 | Status: ACTIVE
Start: 2024-11-19

## 2024-11-19 RX ORDER — ERYTHROMYCIN 5 MG/G
OINTMENT OPHTHALMIC
Qty: 3.5 | Refills: 0 | Status: ACTIVE
Start: 2024-11-19

## 2024-11-19 ASSESSMENT — INTRAOCULAR PRESSURE
OS: 31
OD: 16

## 2024-11-22 ENCOUNTER — OFFICE VISIT (OUTPATIENT)
Dept: URBAN - METROPOLITAN AREA CLINIC 48 | Facility: CLINIC | Age: 69
End: 2024-11-22
Payer: MEDICARE

## 2024-11-22 PROCEDURE — 99213 OFFICE O/P EST LOW 20 MIN: CPT | Performed by: OPHTHALMOLOGY

## 2024-11-22 ASSESSMENT — INTRAOCULAR PRESSURE
OS: 41
OD: 16

## 2024-11-27 ENCOUNTER — OFFICE VISIT (OUTPATIENT)
Dept: URBAN - METROPOLITAN AREA CLINIC 48 | Facility: CLINIC | Age: 69
End: 2024-11-27
Payer: MEDICARE

## 2024-11-27 DIAGNOSIS — H16.012 CENTRAL CORNEAL ULCER, LEFT EYE: Primary | ICD-10-CM

## 2024-11-27 PROCEDURE — 99213 OFFICE O/P EST LOW 20 MIN: CPT | Performed by: OPHTHALMOLOGY

## 2024-11-27 ASSESSMENT — INTRAOCULAR PRESSURE
OS: 41
OD: 19
OS: 35
OD: 13

## 2024-12-05 ENCOUNTER — OFFICE VISIT (OUTPATIENT)
Dept: URBAN - METROPOLITAN AREA CLINIC 48 | Facility: CLINIC | Age: 69
End: 2024-12-05
Payer: MEDICARE

## 2024-12-05 DIAGNOSIS — H40.1423 CAPSLR GLAUCOMA W/PSEUDXF LENS, LEFT EYE, SEVERE STAGE: ICD-10-CM

## 2024-12-05 DIAGNOSIS — H16.012 CENTRAL CORNEAL ULCER, LEFT EYE: Primary | ICD-10-CM

## 2024-12-05 PROCEDURE — 99213 OFFICE O/P EST LOW 20 MIN: CPT | Performed by: OPHTHALMOLOGY

## 2024-12-05 ASSESSMENT — INTRAOCULAR PRESSURE
OS: 18
OD: 18

## 2024-12-18 ENCOUNTER — OFFICE VISIT (OUTPATIENT)
Dept: URBAN - METROPOLITAN AREA CLINIC 48 | Facility: CLINIC | Age: 69
End: 2024-12-18
Payer: MEDICARE

## 2024-12-18 DIAGNOSIS — H40.1423 CAPSLR GLAUCOMA W/PSEUDXF LENS, LEFT EYE, SEVERE STAGE: Primary | ICD-10-CM

## 2024-12-18 PROCEDURE — 99213 OFFICE O/P EST LOW 20 MIN: CPT | Performed by: OPHTHALMOLOGY

## 2024-12-18 ASSESSMENT — INTRAOCULAR PRESSURE
OD: 18
OS: 50

## 2024-12-26 ENCOUNTER — OFFICE VISIT (OUTPATIENT)
Dept: URBAN - METROPOLITAN AREA CLINIC 48 | Facility: CLINIC | Age: 69
End: 2024-12-26
Payer: MEDICARE

## 2024-12-26 DIAGNOSIS — H40.1423 CAPSLR GLAUCOMA W/PSEUDXF LENS, LEFT EYE, SEVERE STAGE: Primary | ICD-10-CM

## 2024-12-26 PROCEDURE — 99213 OFFICE O/P EST LOW 20 MIN: CPT | Performed by: OPHTHALMOLOGY

## 2024-12-26 ASSESSMENT — INTRAOCULAR PRESSURE: OS: 34

## 2025-01-23 ENCOUNTER — OFFICE VISIT (OUTPATIENT)
Dept: URBAN - METROPOLITAN AREA CLINIC 48 | Facility: CLINIC | Age: 70
End: 2025-01-23
Payer: MEDICARE

## 2025-01-23 DIAGNOSIS — H18.12 BULLOUS KERATOPATHY, LEFT EYE: ICD-10-CM

## 2025-01-23 DIAGNOSIS — H40.1423 CAPSULAR GLAUCOMA W/ PSEUDOEXFOLIATION OF LENS OF LEFT EYE, SEVERE STAGE: Primary | ICD-10-CM

## 2025-01-23 PROCEDURE — 99213 OFFICE O/P EST LOW 20 MIN: CPT | Performed by: OPHTHALMOLOGY

## 2025-01-23 ASSESSMENT — INTRAOCULAR PRESSURE: OS: 35

## 2025-06-03 ENCOUNTER — OFFICE VISIT (OUTPATIENT)
Dept: URBAN - METROPOLITAN AREA CLINIC 48 | Facility: CLINIC | Age: 70
End: 2025-06-03
Payer: MEDICARE

## 2025-06-03 DIAGNOSIS — H40.051 OCULAR HYPERTENSION, RIGHT EYE: Primary | ICD-10-CM

## 2025-06-03 DIAGNOSIS — H40.1423 CAPSULAR GLAUCOMA W/ PSEUDOEXFOLIATION OF LENS OF LEFT EYE, SEVERE STAGE: ICD-10-CM

## 2025-06-03 PROCEDURE — 99213 OFFICE O/P EST LOW 20 MIN: CPT | Performed by: OPHTHALMOLOGY

## 2025-06-03 ASSESSMENT — INTRAOCULAR PRESSURE
OS: 41
OD: 23

## 2025-08-07 ENCOUNTER — OFFICE VISIT (OUTPATIENT)
Dept: URBAN - METROPOLITAN AREA CLINIC 48 | Facility: CLINIC | Age: 70
End: 2025-08-07
Payer: MEDICARE

## 2025-08-07 DIAGNOSIS — H40.1423 CAPSLR GLAUCOMA W/PSEUDXF LENS, LEFT EYE, SEVERE STAGE: ICD-10-CM

## 2025-08-07 DIAGNOSIS — H25.11 AGE-RELATED NUCLEAR CATARACT, RIGHT EYE: ICD-10-CM

## 2025-08-07 DIAGNOSIS — H40.051 OCULAR HYPERTENSION, RIGHT EYE: Primary | ICD-10-CM

## 2025-08-07 PROCEDURE — 92133 CPTRZD OPH DX IMG PST SGM ON: CPT | Performed by: OPHTHALMOLOGY

## 2025-08-07 PROCEDURE — 92083 EXTENDED VISUAL FIELD XM: CPT | Performed by: OPHTHALMOLOGY

## 2025-08-07 PROCEDURE — 99214 OFFICE O/P EST MOD 30 MIN: CPT | Performed by: OPHTHALMOLOGY

## 2025-08-07 ASSESSMENT — INTRAOCULAR PRESSURE
OS: 47
OD: 18